# Patient Record
Sex: MALE | Race: WHITE | NOT HISPANIC OR LATINO | URBAN - NONMETROPOLITAN AREA
[De-identification: names, ages, dates, MRNs, and addresses within clinical notes are randomized per-mention and may not be internally consistent; named-entity substitution may affect disease eponyms.]

---

## 2021-01-28 DIAGNOSIS — Z23 ENCOUNTER FOR IMMUNIZATION: ICD-10-CM

## 2021-02-04 ENCOUNTER — IMMUNIZATIONS (OUTPATIENT)
Dept: FAMILY MEDICINE CLINIC | Facility: HOSPITAL | Age: 41
End: 2021-02-04
Attending: INTERNAL MEDICINE

## 2021-02-04 DIAGNOSIS — Z23 ENCOUNTER FOR IMMUNIZATION: Primary | ICD-10-CM

## 2021-02-04 PROCEDURE — 91301 SARS-COV-2 / COVID-19 MRNA VACCINE (MODERNA) 100 MCG: CPT

## 2021-02-04 PROCEDURE — 0011A SARS-COV-2 / COVID-19 MRNA VACCINE (MODERNA) 100 MCG: CPT

## 2021-03-11 ENCOUNTER — IMMUNIZATIONS (OUTPATIENT)
Dept: FAMILY MEDICINE CLINIC | Facility: HOSPITAL | Age: 41
End: 2021-03-11

## 2021-03-11 DIAGNOSIS — Z23 ENCOUNTER FOR IMMUNIZATION: Primary | ICD-10-CM

## 2021-03-11 PROCEDURE — 91301 SARS-COV-2 / COVID-19 MRNA VACCINE (MODERNA) 100 MCG: CPT

## 2021-03-11 PROCEDURE — 0012A SARS-COV-2 / COVID-19 MRNA VACCINE (MODERNA) 100 MCG: CPT

## 2023-01-27 DIAGNOSIS — N28.9 DISORDER OF KIDNEY AND URETER, UNSPECIFIED: ICD-10-CM

## 2023-01-27 DIAGNOSIS — I10 ESSENTIAL (PRIMARY) HYPERTENSION: ICD-10-CM

## 2023-02-03 ENCOUNTER — HOSPITAL ENCOUNTER (OUTPATIENT)
Dept: ULTRASOUND IMAGING | Facility: HOSPITAL | Age: 43
End: 2023-02-03

## 2023-02-03 DIAGNOSIS — I10 ESSENTIAL (PRIMARY) HYPERTENSION: ICD-10-CM

## 2023-02-03 DIAGNOSIS — N28.9 DISORDER OF KIDNEY AND URETER, UNSPECIFIED: ICD-10-CM

## 2023-02-04 ENCOUNTER — HOSPITAL ENCOUNTER (OUTPATIENT)
Dept: NON INVASIVE DIAGNOSTICS | Facility: HOSPITAL | Age: 43
Discharge: HOME | End: 2023-02-04

## 2023-02-04 VITALS — HEART RATE: 80 BPM | DIASTOLIC BLOOD PRESSURE: 84 MMHG | SYSTOLIC BLOOD PRESSURE: 130 MMHG

## 2023-02-04 DIAGNOSIS — I10 ESSENTIAL (PRIMARY) HYPERTENSION: ICD-10-CM

## 2023-02-05 LAB
AORTIC ROOT: 4 CM
APICAL FOUR CHAMBER EJECTION FRACTION: 54 %
ASCENDING AORTA: 4 CM
AV LVOT MEAN GRADIENT: 4 MMHG
AV LVOT PEAK GRADIENT: 5 MMHG
DOP CALC LVOT PEAK VEL VTI: 25.12 CM
DOP CALC LVOT PEAK VEL: 1.16 M/S
DOP CALC RVOT PEAK VEL: 0.82 M/S
DOP CALC RVOT VTI: 15.18 CM
E WAVE DECELERATION TIME: 174 MS
FRACTIONAL SHORTENING: 35 (ref 28–44)
INTERVENTRICULAR SEPTUM IN DIASTOLE (PARASTERNAL SHORT AXIS VIEW): 1.1 CM
INTERVENTRICULAR SEPTUM: 1.1 CM (ref 0.6–1.1)
LAAS-AP2: 20.3 CM2
LAAS-AP4: 17.9 CM2
LEFT ATRIUM SIZE: 4.2 CM
LEFT INTERNAL DIMENSION IN SYSTOLE: 3.3 CM (ref 2.1–4)
LEFT VENTRICULAR INTERNAL DIMENSION IN DIASTOLE: 5.1 CM (ref 3.5–6)
LEFT VENTRICULAR POSTERIOR WALL IN END DIASTOLE: 1.2 CM
LEFT VENTRICULAR STROKE VOLUME: 82 ML
LVSV (TEICH): 82 ML
MV E'TISSUE VEL-SEP: 11 CM/S
MV PEAK A VEL: 0.68 M/S
MV PEAK E VEL: 83 CM/S
MV STENOSIS PRESSURE HALF TIME: 51 MS
MV VALVE AREA P 1/2 METHOD: 4.31
PV MEAN GRADIENT: 3 MMHG
PV MEAN VELOCITY: 79 CM/S
PV PEAK GRADIENT: 5 MMHG
PV PEAK VELOCITY: 111 CM/S
PV VTI: 20.8 CM
RIGHT ATRIAL 2D VOLUME: 47 ML
RIGHT ATRIUM AREA SYSTOLE A4C: 17.6 CM2
RIGHT VENTRICLE ID DIMENSION: 2.9 CM
SL CV LEFT ATRIUM LENGTH A2C: 5.5 CM
SL CV PED ECHO LEFT VENTRICLE DIASTOLIC VOLUME (MOD BIPLANE) 2D: 126 ML
SL CV PED ECHO LEFT VENTRICLE SYSTOLIC VOLUME (MOD BIPLANE) 2D: 44 ML
SL CV RVOT MAX GRADIENT: 3 MMHG
SL CV RVOT MEAN GRADIENT: 1 MMHG
SL CV RVOT VMEAN: 0.56 M/S

## 2023-04-24 ENCOUNTER — OFFICE VISIT (OUTPATIENT)
Dept: URGENT CARE | Facility: CLINIC | Age: 43
End: 2023-04-24

## 2023-04-24 VITALS
RESPIRATION RATE: 18 BRPM | TEMPERATURE: 97.4 F | SYSTOLIC BLOOD PRESSURE: 160 MMHG | HEIGHT: 72 IN | DIASTOLIC BLOOD PRESSURE: 82 MMHG | OXYGEN SATURATION: 98 % | HEART RATE: 87 BPM | WEIGHT: 260 LBS | BODY MASS INDEX: 35.21 KG/M2

## 2023-04-24 DIAGNOSIS — K08.89 PAIN, DENTAL: Primary | ICD-10-CM

## 2023-04-24 RX ORDER — LISINOPRIL AND HYDROCHLOROTHIAZIDE 25; 20 MG/1; MG/1
1 TABLET ORAL EVERY MORNING
COMMUNITY
Start: 2023-03-02

## 2023-04-24 RX ORDER — AMLODIPINE BESYLATE 5 MG/1
10 TABLET ORAL
COMMUNITY
Start: 2023-04-08

## 2023-04-24 RX ORDER — AMOXICILLIN AND CLAVULANATE POTASSIUM 875; 125 MG/1; MG/1
1 TABLET, FILM COATED ORAL EVERY 12 HOURS SCHEDULED
Qty: 14 TABLET | Refills: 0 | Status: SHIPPED | OUTPATIENT
Start: 2023-04-24 | End: 2023-05-01

## 2023-04-24 NOTE — PATIENT INSTRUCTIONS
Take antibiotic as prescribed  OTC Tylenol or Ibuprofen as needed for pain  Warm compresses  Warm, salt water rinses  Follow up with Dentistry as scheduled  Monitor for worsening infection   Follow up with PCP in 3-5 days  Proceed to  ER if symptoms worsen  Dental Abscess   AMBULATORY CARE:   A dental abscess  is a collection of pus in or around a tooth  A dental abscess is caused by bacteria  The bacteria can enter the tooth when the enamel (outer part of the tooth) is damaged by tooth decay  Bacteria can also enter the tooth through a chip in the tooth or a cut in the gum  Food particles that are stuck between the teeth for a long time may also lead to an abscess  Common signs and symptoms:   Toothache, a loose tooth, or a tooth that is very sensitive to pressure or temperature    Bad breath, unpleasant taste, and drooling    Fever    Pain, redness, and swelling of the gums, or swelling of your face and neck    Pain when you open or close your mouth    Trouble opening your mouth    Seek care immediately if:   You have severe pain in your tooth or jaw  You have trouble breathing because of pain or swelling  Call your doctor if:   Your symptoms get worse, even after treatment  Your mouth is bleeding  You cannot eat or drink because of pain or swelling  Your abscess returns  You have an injury that causes a crack in your tooth  You have questions or concerns about your condition or care  Treatment:  You may  need any of the following:  Medicines  may be given to treat a bacterial infection and decrease pain  Incision and drainage  is a cut in the abscess to allow the pus to drain  A sample of fluid may be collected from your abscess  The fluid is sent to a lab and tested for bacteria  Ask your healthcare provider for more information  A root canal  is a procedure to remove the bacteria and prevent more infection  It is usually done after an incision and drainage   A filling or crown will be placed over the tooth after you have healed from your root canal      Tooth removal  may be needed if the infection affects deeper tissues  This is usually done after an incision and drainage  Self-care:   Rinse your mouth every 2 hours with salt water  This will help keep the area clean  Gently brush your teeth twice a day with a soft tooth brush  This will help keep the area clean  Eat soft foods as directed  Soft foods may cause less pain  Examples include applesauce, yogurt, and cooked pasta  Ask your healthcare provider how long to follow this instruction  Apply a warm compress to your tooth or gum  Use a cotton ball or gauze soaked in warm water  Remove the compress in 10 minutes or when it becomes cool  Repeat 3 times a day  Prevent another abscess:   Brush your teeth at least 2 times a day  with fluoride toothpaste  Use dental floss at least once a day  to clean between your teeth  Rinse your mouth with water or mouthwash  after meals and snacks  Chew sugarless gum  Avoid sugary and starchy food that can stick between your teeth  Limit drinks high in sugar, such as soda or fruit juice  See your dentist every 6 months  for dental cleanings and oral exams  Follow up with your doctor or dentist as directed: Your healthcare provider will need to check your teeth and gums  Write down your questions so you remember to ask them during your visits  © Copyright [employer] 2022 Information is for End User's use only and may not be sold, redistributed or otherwise used for commercial purposes  The above information is an  only  It is not intended as medical advice for individual conditions or treatments  Talk to your doctor, nurse or pharmacist before following any medical regimen to see if it is safe and effective for you

## 2023-04-24 NOTE — PROGRESS NOTES
[facility]        NAME: Caren Franklin is a 37 y o  male  : 1980    MRN: [id number]  DATE: 2023  TIME: 6:41 PM    Assessment and Plan   Pain, dental [K08 89]  1  Pain, dental  amoxicillin-clavulanate (AUGMENTIN) 875-125 mg per tablet        Concern for dental infection and so will treat with Augmentin  Encouraged continued supportive measures, including OTC Tylenol/ibuprofen, warm compresses, and warm salt water rinses  Follow-up with dentistry as scheduled  Monitor for signs of worsening infection  Follow up with PCP in 3-5 days or proceed to emergency department for worsening symptoms  Patient verbalized understanding of instructions given  Patient Instructions       Patient Instructions     Take antibiotic as prescribed  OTC Tylenol or Ibuprofen as needed for pain  Warm compresses  Warm, salt water rinses  Follow up with Dentistry as scheduled  Monitor for worsening infection   Follow up with PCP in 3-5 days  Proceed to  ER if symptoms worsen  Dental Abscess   AMBULATORY CARE:   A dental abscess  is a collection of pus in or around a tooth  A dental abscess is caused by bacteria  The bacteria can enter the tooth when the enamel (outer part of the tooth) is damaged by tooth decay  Bacteria can also enter the tooth through a chip in the tooth or a cut in the gum  Food particles that are stuck between the teeth for a long time may also lead to an abscess  Common signs and symptoms:   • Toothache, a loose tooth, or a tooth that is very sensitive to pressure or temperature    • Bad breath, unpleasant taste, and drooling    • Fever    • Pain, redness, and swelling of the gums, or swelling of your face and neck    • Pain when you open or close your mouth    • Trouble opening your mouth    Seek care immediately if:   • You have severe pain in your tooth or jaw  • You have trouble breathing because of pain or swelling      Call your doctor if:   • Your symptoms get worse, even after treatment  • Your mouth is bleeding  • You cannot eat or drink because of pain or swelling  • Your abscess returns  • You have an injury that causes a crack in your tooth  • You have questions or concerns about your condition or care  Treatment:  You may  need any of the following:  • Medicines  may be given to treat a bacterial infection and decrease pain  • Incision and drainage  is a cut in the abscess to allow the pus to drain  A sample of fluid may be collected from your abscess  The fluid is sent to a lab and tested for bacteria  Ask your healthcare provider for more information  • A root canal  is a procedure to remove the bacteria and prevent more infection  It is usually done after an incision and drainage  A filling or crown will be placed over the tooth after you have healed from your root canal      • Tooth removal  may be needed if the infection affects deeper tissues  This is usually done after an incision and drainage  Self-care:   • Rinse your mouth every 2 hours with salt water  This will help keep the area clean  • Gently brush your teeth twice a day with a soft tooth brush  This will help keep the area clean  • Eat soft foods as directed  Soft foods may cause less pain  Examples include applesauce, yogurt, and cooked pasta  Ask your healthcare provider how long to follow this instruction  • Apply a warm compress to your tooth or gum  Use a cotton ball or gauze soaked in warm water  Remove the compress in 10 minutes or when it becomes cool  Repeat 3 times a day  Prevent another abscess:   • Brush your teeth at least 2 times a day  with fluoride toothpaste  • Use dental floss at least once a day  to clean between your teeth  • Rinse your mouth with water or mouthwash  after meals and snacks  Chew sugarless gum  • Avoid sugary and starchy food that can stick between your teeth    Limit drinks high in sugar, such as soda or fruit juice     • See your dentist every 6 months  for dental cleanings and oral exams  Follow up with your doctor or dentist as directed: Your healthcare provider will need to check your teeth and gums  Write down your questions so you remember to ask them during your visits  © Copyright [facility] 2022 Information is for End User's use only and may not be sold, redistributed or otherwise used for commercial purposes  The above information is an  only  It is not intended as medical advice for individual conditions or treatments  Talk to your doctor, nurse or pharmacist before following any medical regimen to see if it is safe and effective for you  Chief Complaint     Chief Complaint   Patient presents with   • Dental Pain     C/o lower left dental pain since 4/6 that worsened overnight; pt has an oral consult on 5/1 but is unable to tolerate pain in the meantime         History of Present Illness       25-year-old male with a past medical history significant for hypertension presents with complaints of left-sided lower dental pain  Patient states acutely worsened pain and left-sided facial swelling x1 day  He is scheduled to see an oral surgeon on Monday  Does not regularly see a dentist  First visit recently within the past 14 years  Denies fever, difficulty breathing, sore throat, vomiting, or diarrhea  He has been taking OTC Tylenol, ibuprofen, and using Orajel  Review of Systems   Review of Systems   Constitutional: Negative for chills and fever  HENT: Positive for dental problem and facial swelling  Negative for congestion, rhinorrhea, sore throat, trouble swallowing and voice change  Eyes: Negative for discharge  Respiratory: Negative for cough, shortness of breath and wheezing  Cardiovascular: Negative for chest pain  Gastrointestinal: Negative for abdominal pain, diarrhea, nausea and vomiting  Musculoskeletal: Negative for myalgias  Skin: Negative for rash  Current Medications       Current Outpatient Medications:   •  amLODIPine (NORVASC) 5 mg tablet, 10 mg, Disp: , Rfl:   •  amoxicillin-clavulanate (AUGMENTIN) 875-125 mg per tablet, Take 1 tablet by mouth every 12 (twelve) hours for 7 days, Disp: 14 tablet, Rfl: 0  •  lisinopril-hydrochlorothiazide (PRINZIDE,ZESTORETIC) 20-25 MG per tablet, Take 1 tablet by mouth every morning, Disp: , Rfl:     Current Allergies     Allergies as of 04/24/2023   • (No Known Allergies)            The following portions of the patient's history were reviewed and updated as appropriate: allergies, current medications, past family history, past medical history, past social history, past surgical history and problem list      Past Medical History:   Diagnosis Date   • Hypertension        Past Surgical History:   Procedure Laterality Date   • CHOLECYSTECTOMY         Family History   Problem Relation Age of Onset   • Diabetes Mother    • Diabetes Father          Medications have been verified  Objective   /82   Pulse 87   Temp (!) 97 4 °F (36 3 °C)   Resp 18   Ht 6' (1 829 m)   Wt 118 kg (260 lb)   SpO2 98%   BMI 35 26 kg/m²   No LMP for male patient  Physical Exam     Physical Exam  Vitals and nursing note reviewed  Constitutional:       General: He is not in acute distress  Appearance: He is not toxic-appearing  HENT:      Head: Normocephalic  Nose: Nose normal       Mouth/Throat:      Mouth: Mucous membranes are moist       Dentition: Abnormal dentition  Dental tenderness and dental caries present  No gingival swelling or dental abscesses  Pharynx: Oropharynx is clear  Eyes:      Conjunctiva/sclera: Conjunctivae normal    Cardiovascular:      Rate and Rhythm: Normal rate and regular rhythm  Heart sounds: Normal heart sounds  Pulmonary:      Effort: Pulmonary effort is normal  No respiratory distress  Breath sounds: Normal breath sounds  No stridor   No wheezing, rhonchi or marvin    Skin:     General: Skin is warm and dry  Neurological:      Mental Status: He is alert and oriented to person, place, and time  Gait: Gait is intact     Psychiatric:         Mood and Affect: Mood normal          Behavior: Behavior normal

## 2023-12-30 ENCOUNTER — OFFICE VISIT (OUTPATIENT)
Dept: URGENT CARE | Facility: CLINIC | Age: 43
End: 2023-12-30
Payer: COMMERCIAL

## 2023-12-30 VITALS
DIASTOLIC BLOOD PRESSURE: 76 MMHG | OXYGEN SATURATION: 98 % | HEART RATE: 97 BPM | TEMPERATURE: 97.6 F | BODY MASS INDEX: 35.21 KG/M2 | SYSTOLIC BLOOD PRESSURE: 140 MMHG | WEIGHT: 260 LBS | HEIGHT: 72 IN | RESPIRATION RATE: 18 BRPM

## 2023-12-30 DIAGNOSIS — J06.9 VIRAL UPPER RESPIRATORY TRACT INFECTION: Primary | ICD-10-CM

## 2023-12-30 LAB
S PYO AG THROAT QL: NEGATIVE
SARS-COV-2 AG UPPER RESP QL IA: NEGATIVE
VALID CONTROL: NORMAL

## 2023-12-30 PROCEDURE — S9088 SERVICES PROVIDED IN URGENT: HCPCS

## 2023-12-30 PROCEDURE — 87880 STREP A ASSAY W/OPTIC: CPT

## 2023-12-30 PROCEDURE — 87811 SARS-COV-2 COVID19 W/OPTIC: CPT

## 2023-12-30 PROCEDURE — 99213 OFFICE O/P EST LOW 20 MIN: CPT

## 2023-12-30 RX ORDER — PREDNISONE 10 MG/1
TABLET ORAL
Qty: 21 TABLET | Refills: 0 | Status: SHIPPED | OUTPATIENT
Start: 2023-12-30

## 2023-12-30 RX ORDER — BENZONATATE 200 MG/1
200 CAPSULE ORAL 3 TIMES DAILY PRN
Qty: 20 CAPSULE | Refills: 0 | Status: SHIPPED | OUTPATIENT
Start: 2023-12-30

## 2023-12-30 NOTE — PROGRESS NOTES
[facility]        NAME: Gustavo Mazariegos is a 43 y.o. male  : 1980    MRN: [id number]  DATE: 2023  TIME: 5:40 PM    Assessment and Plan   Viral upper respiratory tract infection [J06.9]  1. Viral upper respiratory tract infection  Poct Covid 19 Rapid Antigen Test    POCT rapid strepA    benzonatate (TESSALON) 200 MG capsule    predniSONE 10 mg tablet        Discussed problem with patient.  COVID and strep performed today were negative.  Symptoms consistent with viral etiology and prescribing Tessalon Perles as well as prednisone for this issue.  Advised over-the-counter Tylenol for fever reduction.  Push fluids.  Follow-up with PCP if symptoms not improving report to the ER symptoms worsen.    Patient Instructions       Follow up with PCP in 3-5 days.  Proceed to  ER if symptoms worsen.    Chief Complaint     Chief Complaint   Patient presents with   • Sore Throat     Patient states that he has been sore throat, sinus ane chest congestion, coughing fever twice of 102.2 and ear aches since dec 20th. Has tried dayquil, nyqul cough drops and theaflu and nothing has worked.          History of Present Illness       Patient states that he has been sore throat, sinus ane chest congestion, coughing fever twice of 102.2 and ear aches since dec 20th. Has tried dayquil, nyqul cough drops and theaflu and nothing has worked. Longer-term smoker. No flu shot.    Sore Throat   Associated symptoms include congestion, coughing, diarrhea, ear pain (bilateral) and headaches. Pertinent negatives include no abdominal pain, shortness of breath, stridor or vomiting.       Review of Systems   Review of Systems   Constitutional:  Positive for appetite change (pushing fluids), chills and fever (102.2*f, resolved). Negative for fatigue.   HENT:  Positive for congestion, ear pain (bilateral), postnasal drip, rhinorrhea and sore throat. Negative for sinus pressure and sinus pain.    Respiratory:  Positive for cough.  Negative for chest tightness, shortness of breath, wheezing and stridor.    Cardiovascular:  Negative for chest pain and palpitations.   Gastrointestinal:  Positive for diarrhea. Negative for abdominal pain, constipation, nausea and vomiting.   Musculoskeletal:  Positive for myalgias.   Neurological:  Positive for headaches. Negative for dizziness, syncope and light-headedness.         Current Medications       Current Outpatient Medications:   •  amLODIPine (NORVASC) 5 mg tablet, 10 mg, Disp: , Rfl:   •  benzonatate (TESSALON) 200 MG capsule, Take 1 capsule (200 mg total) by mouth 3 (three) times a day as needed for cough, Disp: 20 capsule, Rfl: 0  •  lisinopril-hydrochlorothiazide (PRINZIDE,ZESTORETIC) 20-25 MG per tablet, Take 1 tablet by mouth every morning, Disp: , Rfl:   •  predniSONE 10 mg tablet, Take 6 pills on day 1, take 5 pills on day 2, take 4 pills on day 3, take 3 pills on day 4, take 2 pills on day 5, take 1 pill a day 6., Disp: 21 tablet, Rfl: 0    Current Allergies     Allergies as of 12/30/2023   • (No Known Allergies)            The following portions of the patient's history were reviewed and updated as appropriate: allergies, current medications, past family history, past medical history, past social history, past surgical history and problem list.     Past Medical History:   Diagnosis Date   • Hypertension        Past Surgical History:   Procedure Laterality Date   • CHOLECYSTECTOMY         Family History   Problem Relation Age of Onset   • Diabetes Mother    • Diabetes Father          Medications have been verified.        Objective   /76   Pulse 97   Temp 97.6 °F (36.4 °C) (Tympanic)   Resp 18   Ht 6' (1.829 m)   Wt 118 kg (260 lb)   SpO2 98%   BMI 35.26 kg/m²        Physical Exam     Physical Exam  Vitals and nursing note reviewed.   Constitutional:       General: He is not in acute distress.     Appearance: He is well-developed and normal weight. He is not ill-appearing,  toxic-appearing or diaphoretic.   HENT:      Head: Normocephalic.      Right Ear: Ear canal normal. No tenderness. A middle ear effusion is present. Tympanic membrane is not erythematous.      Left Ear: Ear canal normal. No tenderness. A middle ear effusion is present. Tympanic membrane is not erythematous.      Nose: Congestion present. No rhinorrhea.      Mouth/Throat:      Mouth: Mucous membranes are moist. No oral lesions.      Pharynx: Oropharynx is clear. Uvula midline. Posterior oropharyngeal erythema present. No pharyngeal swelling, oropharyngeal exudate or uvula swelling.      Tonsils: No tonsillar exudate or tonsillar abscesses. 0 on the right. 0 on the left.   Eyes:      Extraocular Movements:      Right eye: Normal extraocular motion.      Left eye: Normal extraocular motion.      Conjunctiva/sclera: Conjunctivae normal.      Pupils: Pupils are equal, round, and reactive to light.   Neck:      Thyroid: No thyromegaly.   Cardiovascular:      Rate and Rhythm: Normal rate and regular rhythm.      Heart sounds: Normal heart sounds. No murmur heard.     No friction rub. No gallop.   Pulmonary:      Effort: Pulmonary effort is normal. No respiratory distress.      Breath sounds: Normal breath sounds. No stridor. No wheezing, rhonchi or rales.   Chest:      Chest wall: No tenderness.   Musculoskeletal:      Cervical back: Normal range of motion.   Lymphadenopathy:      Cervical: Cervical adenopathy present.   Neurological:      Mental Status: He is alert.

## 2024-12-06 ENCOUNTER — RX ONLY (RX ONLY)
Age: 44
End: 2024-12-06

## 2024-12-06 ENCOUNTER — OPTICAL OFFICE (OUTPATIENT)
Dept: URBAN - NONMETROPOLITAN AREA CLINIC 4 | Facility: CLINIC | Age: 44
Setting detail: OPHTHALMOLOGY
End: 2024-12-06
Payer: COMMERCIAL

## 2024-12-06 ENCOUNTER — DOCTOR'S OFFICE (OUTPATIENT)
Dept: URBAN - NONMETROPOLITAN AREA CLINIC 1 | Facility: CLINIC | Age: 44
Setting detail: OPHTHALMOLOGY
End: 2024-12-06
Payer: COMMERCIAL

## 2024-12-06 DIAGNOSIS — H52.223: ICD-10-CM

## 2024-12-06 DIAGNOSIS — H52.4: ICD-10-CM

## 2024-12-06 PROCEDURE — V2784 LENS POLYCARB OR EQUAL: HCPCS | Mod: LT | Performed by: OPTOMETRIST

## 2024-12-06 PROCEDURE — V2025 EYEGLASSES DELUX FRAMES: HCPCS | Performed by: OPTOMETRIST

## 2024-12-06 PROCEDURE — V2020 VISION SVCS FRAMES PURCHASES: HCPCS | Performed by: OPTOMETRIST

## 2024-12-06 PROCEDURE — 92004 COMPRE OPH EXAM NEW PT 1/>: CPT | Performed by: OPTOMETRIST

## 2024-12-06 PROCEDURE — V2203 LENS SPHCYL BIFOCAL 4.00D/.1: HCPCS | Performed by: OPTOMETRIST

## 2024-12-06 PROCEDURE — V2750 ANTI-REFLECTIVE COATING: HCPCS | Mod: T4 | Performed by: OPTOMETRIST

## 2024-12-06 PROCEDURE — V2781 PROGRESSIVE LENS PER LENS: HCPCS | Mod: LT | Performed by: OPTOMETRIST

## 2024-12-06 PROCEDURE — V2750 ANTI-REFLECTIVE COATING: HCPCS | Mod: LT | Performed by: OPTOMETRIST

## 2024-12-06 PROCEDURE — V2781 PROGRESSIVE LENS PER LENS: HCPCS | Mod: T5 | Performed by: OPTOMETRIST

## 2024-12-06 PROCEDURE — V2784 LENS POLYCARB OR EQUAL: HCPCS | Performed by: OPTOMETRIST

## 2024-12-06 ASSESSMENT — REFRACTION_MANIFEST
OS_ADD: +1.50
OD_AXIS: 100
OS_CYLINDER: -1.00
OD_VA2: 20/20
OD_VA1: 20/20
OD_ADD: +1.50
OS_AXIS: 080
OS_SPHERE: +0.25
OD_SPHERE: +0.25
OD_CYLINDER: -1.75
OS_VA2: 20/20
OS_VA1: 20/20

## 2024-12-06 ASSESSMENT — CONFRONTATIONAL VISUAL FIELD TEST (CVF)
OD_FINDINGS: FULL
OS_FINDINGS: FULL

## 2024-12-06 ASSESSMENT — REFRACTION_CURRENTRX
OS_OVR_VA: 20/
OS_AXIS: 090
OD_OVR_VA: 20/
OS_VPRISM_DIRECTION: SV
OS_SPHERE: +0.25
OD_CYLINDER: -1.50
OD_SPHERE: +0.50
OD_VPRISM_DIRECTION: SV
OD_AXIS: 098
OS_CYLINDER: -0.75

## 2024-12-06 ASSESSMENT — REFRACTION_AUTOREFRACTION
OD_AXIS: 096
OD_CYLINDER: -1.75
OD_SPHERE: +0.25
OS_SPHERE: +0.25
OS_AXIS: 078
OS_CYLINDER: -1.00

## 2024-12-06 ASSESSMENT — VISUAL ACUITY
OS_BCVA: 20/20
OD_BCVA: 20/25-2

## 2024-12-06 ASSESSMENT — TONOMETRY
OD_IOP_MMHG: 16
OS_IOP_MMHG: 16

## 2025-03-22 ENCOUNTER — OFFICE VISIT (OUTPATIENT)
Dept: URGENT CARE | Facility: CLINIC | Age: 45
End: 2025-03-22
Payer: COMMERCIAL

## 2025-03-22 VITALS
HEART RATE: 111 BPM | HEIGHT: 72 IN | WEIGHT: 289 LBS | DIASTOLIC BLOOD PRESSURE: 98 MMHG | OXYGEN SATURATION: 98 % | RESPIRATION RATE: 18 BRPM | BODY MASS INDEX: 39.14 KG/M2 | TEMPERATURE: 96.7 F | SYSTOLIC BLOOD PRESSURE: 130 MMHG

## 2025-03-22 DIAGNOSIS — R68.89 FLU-LIKE SYMPTOMS: Primary | ICD-10-CM

## 2025-03-22 PROCEDURE — S9088 SERVICES PROVIDED IN URGENT: HCPCS | Performed by: PHYSICIAN ASSISTANT

## 2025-03-22 PROCEDURE — 99213 OFFICE O/P EST LOW 20 MIN: CPT | Performed by: PHYSICIAN ASSISTANT

## 2025-03-22 RX ORDER — OMEPRAZOLE MAGNESIUM 10 MG/1
20 GRANULE, DELAYED RELEASE ORAL DAILY
COMMUNITY

## 2025-03-22 RX ORDER — BROMPHENIRAMINE MALEATE, PSEUDOEPHEDRINE HYDROCHLORIDE, AND DEXTROMETHORPHAN HYDROBROMIDE 2; 30; 10 MG/5ML; MG/5ML; MG/5ML
10 SYRUP ORAL 3 TIMES DAILY PRN
Qty: 300 ML | Refills: 0 | Status: SHIPPED | OUTPATIENT
Start: 2025-03-22

## 2025-03-22 RX ORDER — LOSARTAN POTASSIUM AND HYDROCHLOROTHIAZIDE 12.5; 5 MG/1; MG/1
1 TABLET ORAL EVERY MORNING
COMMUNITY
Start: 2025-02-03

## 2025-03-22 RX ORDER — ALBUTEROL SULFATE 90 UG/1
2 INHALANT RESPIRATORY (INHALATION) EVERY 6 HOURS PRN
Qty: 8.5 G | Refills: 0 | Status: SHIPPED | OUTPATIENT
Start: 2025-03-22

## 2025-03-22 NOTE — PROGRESS NOTES
[facility]        NAME: Gustavo Mazariegos is a 45 y.o. male  : 1980    MRN: [id number]  DATE: 2025  TIME: 1:49 PM    Assessment and Plan   Flu-like symptoms [R68.89]  1. Flu-like symptoms  brompheniramine-pseudoephedrine-DM 30-2-10 MG/5ML syrup    albuterol (ProAir HFA) 90 mcg/act inhaler          Results  P: 103      Patient Instructions     Assessment & Plan    Viral symptoms may last for a total of 1-3 weeks. Symptoms typically start with a sore throat and progress to include sinus pain/pressure, runny nose (yellow/green), and congestion/cough. The yellow/green color does not necessarily indicate a bacterial sinus infection. If your sinus symptoms worsen on day 10-14, you may want to consider re-evaluation for a possible secondary bacterial sinus infection. Coughs are typically most bothersome the first 1-2 weeks. Coughs frequently linger for 4-6 weeks. However, have your lungs re-evaluated if you develop sudden worsening cough, shortness or breath or chest discomfort.       Medication as prescribed  Vitamin D3 2000 IU daily  Vitamin C 1000mg twice per day  Some studies suggest that Zinc 12.5-15mg every 2 hours while awake x 5 days may shorten the duration cold symptoms by 1-2 days.   Fluids and rest  Nasal saline spray (Extra Strength) 3 drops in each nostril 4x per day may shorten the duration of illness by 1-2 days and help prevent spread.  Over the counter cold medication as needed (EX: Mucinex DM, Tylenol, Flonase)  Sinus Rinses ([facility]-use distilled water only and carefully follow instructions)  Salt water gargles and chloraseptic spray  Throat Coat Tea (herbal licorice root tea for sore throat-add honey unless diabetic)  Warm compresses over sinuses  Steam treatment (utilize proper safety precautions when in contact with hot water/steam)    Follow up with PCP in 3-5 days.  Proceed to  ER if symptoms worsen.    If tests have been performed at [facility], our office will contact you  with results if changes need to be made to the care plan discussed with you at the visit.  You can review your full results on [facility].    Chief Complaint     Chief Complaint   Patient presents with    Cold Like Symptoms     Pt c/o head congestion starting x4 days ago. As of last night pt has sore throat, chest congestion, and runny nose. No fevers that he's aware of but has had body aches and chills .          History of Present Illness     History of Present Illness      URI   This is a new problem. Episode onset: 4d. Associated symptoms include congestion, coughing, rhinorrhea and a sore throat. Pertinent negatives include no diarrhea, ear pain, headaches, nausea, rash, sinus pain, sneezing, vomiting or wheezing. Treatments tried: dayquil, nyquil, vicks.       Review of Systems   Review of Systems   Constitutional:  Positive for chills and fatigue. Negative for fever.   HENT:  Positive for congestion, rhinorrhea and sore throat. Negative for dental problem, ear discharge, ear pain, facial swelling, postnasal drip, sinus pressure, sinus pain, sneezing and trouble swallowing.    Eyes:  Negative for itching.   Respiratory:  Positive for cough and shortness of breath. Negative for chest tightness and wheezing.    Gastrointestinal:  Negative for constipation, diarrhea, nausea and vomiting.   Musculoskeletal:  Positive for myalgias.   Skin:  Negative for rash.   Neurological:  Negative for dizziness, light-headedness and headaches.         Current Medications       Current Outpatient Medications:     albuterol (ProAir HFA) 90 mcg/act inhaler, Inhale 2 puffs every 6 (six) hours as needed for wheezing, Disp: 8.5 g, Rfl: 0    amLODIPine (NORVASC) 5 mg tablet, 10 mg, Disp: , Rfl:     brompheniramine-pseudoephedrine-DM 30-2-10 MG/5ML syrup, Take 10 mL by mouth 3 (three) times a day as needed for cough or congestion, Disp: 300 mL, Rfl: 0    losartan-hydrochlorothiazide (HYZAAR) 50-12.5 mg per tablet, Take 1 tablet  by mouth every morning, Disp: , Rfl:     Omeprazole Magnesium (PriLOSEC) 10 MG PACK, Take 20 mg by mouth daily, Disp: , Rfl:     benzonatate (TESSALON) 200 MG capsule, Take 1 capsule (200 mg total) by mouth 3 (three) times a day as needed for cough (Patient not taking: Reported on 3/22/2025), Disp: 20 capsule, Rfl: 0    lisinopril-hydrochlorothiazide (PRINZIDE,ZESTORETIC) 20-25 MG per tablet, Take 1 tablet by mouth every morning (Patient not taking: Reported on 3/22/2025), Disp: , Rfl:     predniSONE 10 mg tablet, Take 6 pills on day 1, take 5 pills on day 2, take 4 pills on day 3, take 3 pills on day 4, take 2 pills on day 5, take 1 pill a day 6. (Patient not taking: Reported on 3/22/2025), Disp: 21 tablet, Rfl: 0    Current Allergies     Allergies as of 03/22/2025    (No Known Allergies)            The following portions of the patient's history were reviewed and updated as appropriate: allergies, current medications, past family history, past medical history, past social history, past surgical history and problem list.     Past Medical History:   Diagnosis Date    Hypertension        Past Surgical History:   Procedure Laterality Date    CHOLECYSTECTOMY         Family History   Problem Relation Age of Onset    Diabetes Mother     Diabetes Father          Medications have been verified.        Objective   /98   Pulse (!) 111   Temp (!) 96.7 °F (35.9 °C)   Resp 18   Ht 6' (1.829 m)   Wt 131 kg (289 lb)   SpO2 98%   BMI 39.20 kg/m²   No LMP for male patient.       Physical Exam     Physical Exam  Vitals reviewed.   Constitutional:       General: He is not in acute distress.     Appearance: He is well-developed. He is not diaphoretic.   HENT:      Head: Normocephalic.      Right Ear: Tympanic membrane, ear canal and external ear normal.      Left Ear: Tympanic membrane, ear canal and external ear normal.      Nose: Nose normal.      Mouth/Throat:      Pharynx: No oropharyngeal exudate or posterior  oropharyngeal erythema.   Eyes:      Conjunctiva/sclera: Conjunctivae normal.   Cardiovascular:      Rate and Rhythm: Normal rate and regular rhythm.      Heart sounds: Normal heart sounds. No murmur heard.     No friction rub. No gallop.   Pulmonary:      Effort: Pulmonary effort is normal. No respiratory distress.      Breath sounds: Normal breath sounds. No wheezing, rhonchi or rales.   Lymphadenopathy:      Cervical: No cervical adenopathy.   Skin:     General: Skin is warm.   Neurological:      Mental Status: He is alert and oriented to person, place, and time.   Psychiatric:         Behavior: Behavior normal.         Thought Content: Thought content normal.         Judgment: Judgment normal.

## 2025-03-22 NOTE — PATIENT INSTRUCTIONS
Viral symptoms may last for a total of 1-3 weeks. Symptoms typically start with a sore throat and progress to include sinus pain/pressure, runny nose (yellow/green), and congestion/cough. The yellow/green color does not necessarily indicate a bacterial sinus infection. If your sinus symptoms worsen on day 10-14, you may want to consider re-evaluation for a possible secondary bacterial sinus infection. Coughs are typically most bothersome the first 1-2 weeks. Coughs frequently linger for 4-6 weeks. However, have your lungs re-evaluated if you develop sudden worsening cough, shortness or breath or chest discomfort.       Medication as prescribed  Vitamin D3 2000 IU daily  Vitamin C 1000mg twice per day  Some studies suggest that Zinc 12.5-15mg every 2 hours while awake x 5 days may shorten the duration cold symptoms by 1-2 days.   Fluids and rest  Nasal saline spray (Extra Strength) 3 drops in each nostril 4x per day may shorten the duration of illness by 1-2 days and help prevent spread.  Over the counter cold medication as needed (EX: Mucinex DM, Tylenol, Flonase)  Sinus Rinses ([facility]-use distilled water only and carefully follow instructions)  Salt water gargles and chloraseptic spray  Throat Coat Tea (herbal licorice root tea for sore throat-add honey unless diabetic)  Warm compresses over sinuses  Steam treatment (utilize proper safety precautions when in contact with hot water/steam)    Follow up with PCP in 3-5 days.  Proceed to  ER if symptoms worsen.    If tests have been performed at [facility], our office will contact you with results if changes need to be made to the care plan discussed with you at the visit.  You can review your full results on [facility] MyChart.

## 2025-04-16 DIAGNOSIS — R68.89 FLU-LIKE SYMPTOMS: ICD-10-CM

## 2025-04-28 RX ORDER — ALBUTEROL SULFATE 90 UG/1
INHALANT RESPIRATORY (INHALATION)
OUTPATIENT
Start: 2025-04-28

## 2025-07-14 ENCOUNTER — OFFICE VISIT (OUTPATIENT)
Dept: URGENT CARE | Facility: CLINIC | Age: 45
End: 2025-07-14
Payer: COMMERCIAL

## 2025-07-14 ENCOUNTER — APPOINTMENT (OUTPATIENT)
Dept: RADIOLOGY | Facility: CLINIC | Age: 45
End: 2025-07-14
Payer: COMMERCIAL

## 2025-07-14 ENCOUNTER — OFFICE VISIT (OUTPATIENT)
Dept: OBGYN CLINIC | Facility: CLINIC | Age: 45
End: 2025-07-14
Payer: COMMERCIAL

## 2025-07-14 VITALS
BODY MASS INDEX: 40.63 KG/M2 | RESPIRATION RATE: 18 BRPM | HEART RATE: 98 BPM | DIASTOLIC BLOOD PRESSURE: 90 MMHG | HEIGHT: 72 IN | TEMPERATURE: 97.1 F | SYSTOLIC BLOOD PRESSURE: 145 MMHG | OXYGEN SATURATION: 98 % | WEIGHT: 300 LBS

## 2025-07-14 VITALS — HEIGHT: 72 IN | BODY MASS INDEX: 38.68 KG/M2 | WEIGHT: 285.6 LBS

## 2025-07-14 DIAGNOSIS — S93.402A SPRAIN OF LEFT ANKLE, UNSPECIFIED LIGAMENT, INITIAL ENCOUNTER: Primary | ICD-10-CM

## 2025-07-14 DIAGNOSIS — S93.422A SPRAIN OF DELTOID LIGAMENT OF LEFT ANKLE, INITIAL ENCOUNTER: ICD-10-CM

## 2025-07-14 DIAGNOSIS — S99.912A INJURY OF LEFT ANKLE, INITIAL ENCOUNTER: ICD-10-CM

## 2025-07-14 PROCEDURE — S9088 SERVICES PROVIDED IN URGENT: HCPCS

## 2025-07-14 PROCEDURE — 73630 X-RAY EXAM OF FOOT: CPT

## 2025-07-14 PROCEDURE — 99203 OFFICE O/P NEW LOW 30 MIN: CPT | Performed by: ORTHOPAEDIC SURGERY

## 2025-07-14 PROCEDURE — 99214 OFFICE O/P EST MOD 30 MIN: CPT

## 2025-07-14 PROCEDURE — 73610 X-RAY EXAM OF ANKLE: CPT

## 2025-07-14 NOTE — LETTER
July 14, 2025     Patient: Gustavo Mazariegos  YOB: 1980  Date of Visit: 7/14/2025      To Whom it May Concern:    Gustavo Mazariegos is under my professional care. Gustavo was seen in my office on 7/14/2025. Gustavo may return to work on 7/15/2025.  He must be permitted to wear the cam boot at all times.  This restriction will remain in effect until he is rechecked in 2 weeks.    If you have any questions or concerns, please don't hesitate to call.         Sincerely,          Cedric Mcdermott DO        CC: No Recipients

## 2025-07-14 NOTE — PATIENT INSTRUCTIONS
Preliminary XR read negative, final read pending  CAM boot  Rest, Ice, Compression, and Elevation  OTC Tylenol/Ibuprofen as needed  Referral placed to Orthopedic Surgery  Follow up with PCP in 3-5 days.  Proceed to  ER if symptoms worsen.    If tests are performed, our office will contact you with results only if changes need to made to the care plan discussed with you at the visit. You can review your full results on [facility] MyChart.

## 2025-07-14 NOTE — PROGRESS NOTES
"[facility]  Name: Gustavo Mazariegos      : 1980      MRN: [id number]  Encounter Provider: MATILDE Kinney  Encounter Date: 2025   Encounter department: [facility]  :  Assessment & Plan  Sprain of left ankle, unspecified ligament, initial encounter    Preliminary XR read negative for acute osseous abnormality, final read pending. CAM boot applied by Ginny Booker. DME paperwork completed. RICE therapy. OTC Tylenol/Ibuprofen. Encouraged continued supportive measures.  Referral placed to Orthopedic Surgery. Follow up with PCP in 3-5 days or proceed to emergency department for worsening symptoms.  Patient verbalized understanding of instructions given.       Orders:    XR ankle 3+ vw left; Future    XR foot 3+ vw left; Future    Ambulatory Referral to Orthopedic Surgery; Future        Patient Instructions  Preliminary XR read negative, final read pending  CAM boot  Rest, Ice, Compression, and Elevation  OTC Tylenol/Ibuprofen as needed  Referral placed to Orthopedic Surgery  Follow up with PCP in 3-5 days.  Proceed to  ER if symptoms worsen.    If tests are performed, our office will contact you with results only if changes need to made to the care plan discussed with you at the visit. You can review your full results on [facility].    Chief Complaint:   Chief Complaint   Patient presents with    Ankle Pain     Left ankle pain x2-3 days after rolling ankle; has been having swelling; decreased ROM   Using icy hot and compression for relief     History of Present Illness   45-year-old male with a past medical history significant for hypertension presents with complaints of left ankle injury.  Patient reports 2 to 3 days ago was running while playing [employer] and stepped into a divot causing him to roll his ankle and he felt a \"pop.\"  Patient states inversion injury.  Was able to ambulate and bear weight after event however has noticed decreased range of " motion as well as persistent swelling without bruising.  He has been wearing an elastic bandage as well as icing and elevating extremity.  Denies numbness, tingling, or weakness.    Ankle Pain   Pertinent negatives include no numbness.         Review of Systems   Constitutional:  Negative for chills and fever.   Respiratory:  Negative for cough, shortness of breath and wheezing.    Cardiovascular:  Negative for chest pain.   Gastrointestinal:  Negative for abdominal pain, diarrhea, nausea and vomiting.   Musculoskeletal:  Positive for arthralgias, gait problem and joint swelling.   Skin:  Positive for color change. Negative for rash.   Neurological:  Negative for weakness and numbness.     Past Medical History   Past Medical History[1]  Past Surgical History[2]  Family History[3]  he reports that he quit smoking about 19 months ago. His smoking use included cigarettes. He has never used smokeless tobacco. He reports that he does not currently use alcohol. He reports that he does not use drugs.  Current Outpatient Medications   Medication Instructions    albuterol (ProAir HFA) 90 mcg/act inhaler 2 puffs, Inhalation, Every 6 hours PRN    amLODIPine (NORVASC) 5 mg tablet 10 mg    benzonatate (TESSALON) 200 mg, Oral, 3 times daily PRN    brompheniramine-pseudoephedrine-DM 30-2-10 MG/5ML syrup 10 mL, Oral, 3 times daily PRN    lisinopril-hydrochlorothiazide (PRINZIDE,ZESTORETIC) 20-25 MG per tablet 1 tablet, Every morning    losartan-hydrochlorothiazide (HYZAAR) 50-12.5 mg per tablet 1 tablet, Every morning    predniSONE 10 mg tablet Take 6 pills on day 1, take 5 pills on day 2, take 4 pills on day 3, take 3 pills on day 4, take 2 pills on day 5, take 1 pill a day 6.    PriLOSEC 20 mg, Daily   Allergies[4]     Objective   /90   Pulse 98   Temp (!) 97.1 °F (36.2 °C)   Resp 18   Ht 6' (1.829 m)   Wt 136 kg (300 lb)   SpO2 98%   BMI 40.69 kg/m²      Physical Exam  Vitals and nursing note reviewed.  "  Constitutional:       General: He is not in acute distress.     Appearance: He is not toxic-appearing.   HENT:      Head: Normocephalic.     Eyes:      Conjunctiva/sclera: Conjunctivae normal.     Pulmonary:      Effort: Pulmonary effort is normal.     Musculoskeletal:         General: Swelling and tenderness present.      Left lower leg: Normal.      Left ankle: Swelling present. Tenderness present over the lateral malleolus and medial malleolus. No base of 5th metatarsal tenderness. Decreased range of motion.      Left foot: Swelling present. No tenderness or bony tenderness.     Skin:     General: Skin is warm and dry.     Neurological:      Mental Status: He is alert and oriented to person, place, and time.      Sensory: Sensation is intact.      Motor: Motor function is intact.      Gait: Gait is intact.     Psychiatric:         Mood and Affect: Mood normal.         Behavior: Behavior normal.         Portions of the record may have been created with voice recognition software.  Occasional wrong word or \"sound a like\" substitutions may have occurred due to the inherent limitations of voice recognition software.  Read the chart carefully and recognize, using context, where substitutions have occurred.         [1]   Past Medical History:  Diagnosis Date    GERD (gastroesophageal reflux disease)     Hypertension    [2]   Past Surgical History:  Procedure Laterality Date    CHOLECYSTECTOMY     [3]   Family History  Problem Relation Name Age of Onset    Diabetes Mother      Diabetes Father     [4] No Known Allergies    "

## 2025-07-14 NOTE — LETTER
July 14, 2025     Patient: Gustavo Mazariegos   YOB: 1980   Date of Visit: 7/14/2025       To Whom it May Concern:    Gustavo Mazariegos was seen in my clinic on 7/14/2025. He may return to work on 7/16/2025 and is to follow up with Orthopedic Surgery.     If you have any questions or concerns, please don't hesitate to call.         Sincerely,          MATILDE Kinney        CC: No Recipients

## 2025-07-14 NOTE — PROGRESS NOTES
Name: Gustavo Mazariegos      : 1980      MRN: [id number]  Encounter Provider: Cedric Mcdermott DO  Encounter Date: 2025   Encounter department: [facility] [place]  :  Assessment & Plan  Sprain of deltoid ligament of left ankle, initial encounter  Treatment options were discussed.  He elected to continue with the cam boot but did not want to attend physical therapy at this time.  I have given him a note allowing him to return to work with restrictions as detailed and provided in the note.  I will see him in 2 weeks for reevaluation.  If he were to change his mind regarding physical therapy, the office should be contacted and a prescription can be placed.  Orders:    Ambulatory Referral to Orthopedic Surgery        History of Present Illness   HPI  Gustavo Mazariegos is a 45 y.o. male who presents for evaluation of his left ankle injured while playing [employer] on 2025.  He was able to bear weight afterwards although with pain and did not seek immediate medical care.  Due to persistent symptoms he presented to the [facility] in [place] earlier today, x-rays were obtained and he was placed into a cam boot.  He presents now for orthopedic evaluation and treatment.  He complains of pain medially.  He notes minimal, if any, complaints laterally.  He believes his pain has improved somewhat over the last 36 hours but the swelling and ecchymosis are slightly worse, perhaps no different.  He denies any paresthesias or history of prior injuries.  He denies any additional injuries.      Review of Systems       Objective   Ht 6' (1.829 m)   Wt 130 kg (285 lb 9.6 oz)   BMI 38.73 kg/m²      Physical Exam  Exam today demonstrates the boot in place upon arrival.  This was removed without difficulty.  Does have some swelling about the ankle including medially and laterally.  There is no visible ecchymosis and no abrasions or lacerations.  He has no tenderness over the lateral ankle ligaments or the  distal fibula.  He did complain of tenderness over the deltoid ligament especially the posterior portion with a lesser degree of tenderness over the medial malleolus.  Syndesmotic compression elicits complaints of pain localized to the area of compression.  He has negative anterior draw.  He has good range of motion of the ankle and foot slightly restricted dorsiflexion consistent with his injury.  He denies pain with resisted plantarflexion of the great toe or plantarflexion of the ankle.  Distal sensation is intact.  Pulses are palpable and good color and capillary refill is noted.  The bony and soft tissue structures throughout the foot are nontender.  The Achilles tendon is nontender and Hanna test is negative.    X-rays of the ankle demonstrate no evidence of bony injury.  There are some minor degenerative changes noted.

## 2025-07-28 ENCOUNTER — OFFICE VISIT (OUTPATIENT)
Dept: OBGYN CLINIC | Facility: CLINIC | Age: 45
End: 2025-07-28
Payer: COMMERCIAL

## 2025-07-28 VITALS — HEIGHT: 72 IN | BODY MASS INDEX: 38.6 KG/M2 | WEIGHT: 285 LBS

## 2025-07-28 DIAGNOSIS — M76.821 POSTERIOR TIBIAL TENDINITIS OF RIGHT LOWER EXTREMITY: ICD-10-CM

## 2025-07-28 DIAGNOSIS — S93.422D SPRAIN OF DELTOID LIGAMENT OF LEFT ANKLE, SUBSEQUENT ENCOUNTER: Primary | ICD-10-CM

## 2025-07-28 PROBLEM — S93.422A SPRAIN OF DELTOID LIGAMENT OF LEFT ANKLE: Status: ACTIVE | Noted: 2025-07-28

## 2025-07-28 PROCEDURE — 99213 OFFICE O/P EST LOW 20 MIN: CPT | Performed by: ORTHOPAEDIC SURGERY

## 2025-08-15 ENCOUNTER — OFFICE VISIT (OUTPATIENT)
Dept: OBGYN CLINIC | Facility: CLINIC | Age: 45
End: 2025-08-15
Payer: COMMERCIAL